# Patient Record
Sex: FEMALE | Race: WHITE | Employment: UNEMPLOYED | URBAN - METROPOLITAN AREA
[De-identification: names, ages, dates, MRNs, and addresses within clinical notes are randomized per-mention and may not be internally consistent; named-entity substitution may affect disease eponyms.]

---
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
## 2024-04-21 ENCOUNTER — HOSPITAL ENCOUNTER (EMERGENCY)
Age: 19
Discharge: HOME | End: 2024-04-21
Payer: COMMERCIAL

## 2024-04-21 VITALS
RESPIRATION RATE: 16 BRPM | OXYGEN SATURATION: 100 % | HEIGHT: 62 IN | DIASTOLIC BLOOD PRESSURE: 66 MMHG | SYSTOLIC BLOOD PRESSURE: 113 MMHG | WEIGHT: 160 LBS | HEART RATE: 71 BPM | TEMPERATURE: 98.1 F | BODY MASS INDEX: 29.44 KG/M2
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
## 2024-04-21 PROCEDURE — 90714 TD VACC NO PRESV 7 YRS+ IM: CPT | Performed by: PHYSICIAN ASSISTANT

## 2024-04-21 PROCEDURE — 90471 IMMUNIZATION ADMIN: CPT | Performed by: PHYSICIAN ASSISTANT

## 2024-04-21 PROCEDURE — 6370000000 HC RX 637 (ALT 250 FOR IP): Performed by: PHYSICIAN ASSISTANT

## 2024-04-21 PROCEDURE — 6360000002 HC RX W HCPCS: Performed by: PHYSICIAN ASSISTANT

## 2024-04-21 PROCEDURE — 99284 EMERGENCY DEPT VISIT MOD MDM: CPT

## 2024-04-21 RX ORDER — AMOXICILLIN AND CLAVULANATE POTASSIUM 875; 125 MG/1; MG/1
1 TABLET, FILM COATED ORAL ONCE
Status: COMPLETED | OUTPATIENT
Start: 2024-04-21 | End: 2024-04-21

## 2024-04-21 RX ORDER — AMOXICILLIN AND CLAVULANATE POTASSIUM 875; 125 MG/1; MG/1
1 TABLET, FILM COATED ORAL 2 TIMES DAILY
Qty: 14 TABLET | Refills: 0 | Status: SHIPPED | OUTPATIENT
Start: 2024-04-21 | End: 2024-04-28

## 2024-04-21 RX ADMIN — AMOXICILLIN AND CLAVULANATE POTASSIUM 1 TABLET: 875; 125 TABLET, FILM COATED ORAL at 00:58

## 2024-04-21 RX ADMIN — CLOSTRIDIUM TETANI TOXOID ANTIGEN (FORMALDEHYDE INACTIVATED) AND CORYNEBACTERIUM DIPHTHERIAE TOXOID ANTIGEN (FORMALDEHYDE INACTIVATED) 0.5 ML: 5; 2 INJECTION, SUSPENSION INTRAMUSCULAR at 00:59

## 2024-04-21 ASSESSMENT — LIFESTYLE VARIABLES
HOW OFTEN DO YOU HAVE A DRINK CONTAINING ALCOHOL: NEVER
HOW MANY STANDARD DRINKS CONTAINING ALCOHOL DO YOU HAVE ON A TYPICAL DAY: PATIENT DOES NOT DRINK

## 2024-04-21 ASSESSMENT — PAIN SCALES - GENERAL: PAINLEVEL_OUTOF10: 6

## 2024-04-21 ASSESSMENT — PAIN - FUNCTIONAL ASSESSMENT: PAIN_FUNCTIONAL_ASSESSMENT: 0-10

## 2024-04-21 NOTE — ED PROVIDER NOTES
DIAGNOSIS/MDM:   Vitals:    Vitals:    04/21/24 0030   BP: 113/66   Pulse: 71   Resp: 16   Temp: 98.1 °F (36.7 °C)   TempSrc: Oral   SpO2: 100%   Weight: 72.6 kg (160 lb)   Height: 1.575 m (5' 2\")       Patient was given the following medications:  Medications   tetanus & diphtheria toxoids (adult) 5-2 LFU injection 0.5 mL (0.5 mLs IntraMUSCular Given 4/21/24 0059)   amoxicillin-clavulanate (AUGMENTIN) 875-125 MG per tablet 1 tablet (1 tablet Oral Given 4/21/24 0058)             Is this patient to be included in the SEP-1 Core Measure due to severe sepsis or septic shock?   No   Exclusion criteria - the patient is NOT to be included for SEP-1 Core Measure due to:  Infection is not suspected    Chronic Conditions affecting care:  has a past medical history of Anxiety, Asthma, Depression, and RAD (reactive airway disease) (12/17/2012).    CONSULTS: (Who and What was discussed)  None      Social Determinants Significantly Affecting Health : None    Records Reviewed (External and Source) previous telemedicine office visit    CC/HPI Summary, DDx, ED Course, and Reassessment: Briefly, this is a 18-year-old female who presents to the emergency department today for evaluation for concerns of a cat bite which occurred earlier today.  She reports that her boyfriend's mother was taking care of a stray cat, and states that they have been taking care of the cat for several weeks.  The cat has not yet been to a .  She reports that the cat is receiving medications, and she states that she was attempting to give the Medications, when the cat bit her wrist.    Patient is unsure of her last tetanus, and states that they gave given the cat has not been to of that.  On examination she does have 4 puncture wounds noted with some mild edema but no erythema or warmth.      Disposition Considerations (tests considered but not done, Admit vs D/C, Shared Decision Making, Pt Expectation of Test or Tx.):    Patient's tetanus will

## 2024-09-10 ENCOUNTER — OFFICE VISIT (OUTPATIENT)
Dept: FAMILY MEDICINE CLINIC | Age: 19
End: 2024-09-10

## 2024-09-10 VITALS
HEIGHT: 62 IN | RESPIRATION RATE: 16 BRPM | HEART RATE: 82 BPM | BODY MASS INDEX: 29.33 KG/M2 | SYSTOLIC BLOOD PRESSURE: 100 MMHG | WEIGHT: 159.4 LBS | TEMPERATURE: 97 F | OXYGEN SATURATION: 96 % | DIASTOLIC BLOOD PRESSURE: 60 MMHG

## 2024-09-10 DIAGNOSIS — Z00.01 ENCOUNTER FOR WELL ADULT EXAM WITH ABNORMAL FINDINGS: Primary | ICD-10-CM

## 2024-09-10 DIAGNOSIS — Z11.9 SCREENING EXAMINATION FOR INFECTIOUS DISEASE: ICD-10-CM

## 2024-09-10 DIAGNOSIS — Z23 NEED FOR INFLUENZA VACCINATION: ICD-10-CM

## 2024-09-10 DIAGNOSIS — L70.0 ACNE VULGARIS: ICD-10-CM

## 2024-09-10 RX ORDER — LEVONORGESTREL 52 MG/1
1 INTRAUTERINE DEVICE INTRAUTERINE ONCE
COMMUNITY

## 2024-09-10 RX ORDER — BENZOYL PEROXIDE 10 G/100G
SUSPENSION TOPICAL
Qty: 227 G | Refills: 3 | Status: SHIPPED | OUTPATIENT
Start: 2024-09-10

## 2024-09-10 SDOH — ECONOMIC STABILITY: FOOD INSECURITY: WITHIN THE PAST 12 MONTHS, THE FOOD YOU BOUGHT JUST DIDN'T LAST AND YOU DIDN'T HAVE MONEY TO GET MORE.: NEVER TRUE

## 2024-09-10 SDOH — ECONOMIC STABILITY: FOOD INSECURITY: WITHIN THE PAST 12 MONTHS, YOU WORRIED THAT YOUR FOOD WOULD RUN OUT BEFORE YOU GOT MONEY TO BUY MORE.: NEVER TRUE

## 2024-09-10 SDOH — ECONOMIC STABILITY: INCOME INSECURITY: HOW HARD IS IT FOR YOU TO PAY FOR THE VERY BASICS LIKE FOOD, HOUSING, MEDICAL CARE, AND HEATING?: NOT HARD AT ALL

## 2024-09-10 ASSESSMENT — PATIENT HEALTH QUESTIONNAIRE - PHQ9
SUM OF ALL RESPONSES TO PHQ9 QUESTIONS 1 & 2: 0
2. FEELING DOWN, DEPRESSED OR HOPELESS: NOT AT ALL
SUM OF ALL RESPONSES TO PHQ QUESTIONS 1-9: 0
1. LITTLE INTEREST OR PLEASURE IN DOING THINGS: NOT AT ALL

## 2024-09-11 LAB
C TRACH DNA UR QL NAA+PROBE: NEGATIVE
N GONORRHOEA DNA UR QL NAA+PROBE: NEGATIVE

## 2024-10-01 ENCOUNTER — OFFICE VISIT (OUTPATIENT)
Dept: FAMILY MEDICINE CLINIC | Age: 19
End: 2024-10-01
Payer: COMMERCIAL

## 2024-10-01 VITALS
OXYGEN SATURATION: 98 % | DIASTOLIC BLOOD PRESSURE: 80 MMHG | RESPIRATION RATE: 16 BRPM | SYSTOLIC BLOOD PRESSURE: 108 MMHG | HEART RATE: 83 BPM | WEIGHT: 157.4 LBS | BODY MASS INDEX: 28.79 KG/M2 | TEMPERATURE: 97.1 F

## 2024-10-01 DIAGNOSIS — J45.990 EXERCISE INDUCED BRONCHOSPASM: ICD-10-CM

## 2024-10-01 DIAGNOSIS — R68.89 FLU-LIKE SYMPTOMS: Primary | ICD-10-CM

## 2024-10-01 DIAGNOSIS — J02.9 SORE THROAT: ICD-10-CM

## 2024-10-01 LAB
INFLUENZA A ANTIGEN, POC: NEGATIVE
INFLUENZA B ANTIGEN, POC: NEGATIVE
LOT EXPIRE DATE: NORMAL
LOT KIT NUMBER: NORMAL
S PYO AG THROAT QL: NORMAL
SARS-COV-2, POC: NORMAL
VALID INTERNAL CONTROL: NORMAL
VENDOR AND KIT NAME POC: NORMAL

## 2024-10-01 PROCEDURE — G8484 FLU IMMUNIZE NO ADMIN: HCPCS | Performed by: FAMILY MEDICINE

## 2024-10-01 PROCEDURE — 99213 OFFICE O/P EST LOW 20 MIN: CPT | Performed by: FAMILY MEDICINE

## 2024-10-01 PROCEDURE — 87880 STREP A ASSAY W/OPTIC: CPT | Performed by: FAMILY MEDICINE

## 2024-10-01 PROCEDURE — 1036F TOBACCO NON-USER: CPT | Performed by: FAMILY MEDICINE

## 2024-10-01 PROCEDURE — G8427 DOCREV CUR MEDS BY ELIG CLIN: HCPCS | Performed by: FAMILY MEDICINE

## 2024-10-01 PROCEDURE — 87428 SARSCOV & INF VIR A&B AG IA: CPT | Performed by: FAMILY MEDICINE

## 2024-10-01 PROCEDURE — G8419 CALC BMI OUT NRM PARAM NOF/U: HCPCS | Performed by: FAMILY MEDICINE

## 2024-10-01 RX ORDER — ALBUTEROL SULFATE 90 UG/1
2 INHALANT RESPIRATORY (INHALATION) 4 TIMES DAILY PRN
Qty: 18 G | Refills: 0 | Status: SHIPPED | OUTPATIENT
Start: 2024-10-01

## 2024-10-01 NOTE — PROGRESS NOTES
Chief complaint: Cough (HA, ST) and Fever (chills)      SUBJECTIVE:  HPI  Roxana Verdin (:  2005) is a 19 y.o. female with ANKITA and remote hx of asthma who presents with a chief complaint of: fever,cough, congestion, fevers/chills  Had fever 2 days ago. Feels slightly better today - hasn't needed dayquil or nyquil today, was living off it yesterday.  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 Did not have difficulty breathing.  Didn't mention it last time (at her annual a month ago) - is wheezing again when doing the treadmill. Ivonne like when she was a kid.   Hasn't needed albuterol since she was in 5th grade. Not waking up at night with/ cough or short of breath.  Bad cough for 2-3 weeks.  Has been sick 5 times in the last 5-6 mos - fevers,  calling off work; more since moving out. 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     Patient Active Problem List   Diagnosis    Allergic rhinitis    Adjustment disorder with mixed anxiety and depressed mood    History of speech therapy    Bilateral ankle pain    Muscle tightness    Sever's apophysitis, bilateral    Exercise induced bronchospasm     Past Medical History:   Diagnosis Date    Anxiety     Asthma     Depression     RAD (reactive airway disease) 2012     Current Outpatient Medications on File Prior to Visit   Medication Sig Dispense Refill    benzoyl peroxide 10 % external wash Use to wash face and body one to two times daily. 227 g 3    levonorgestrel (MIRENA, 52 MG,) IUD 52 mg 1 each by IntraUTERine route once Placed 2020; must remove in 2028      ibuprofen (IBU) 400 MG tablet Take 0.5 tablets by mouth every 8 hours as needed for Pain for 14 doses. Take with food 14 tablet 0     No current facility-administered medications on file prior to visit.       OBJECTIVE:  /80   Pulse 83   Temp 97.1 °F (36.2 °C) (Temporal)   Resp 16   Wt 71.4 kg (157 lb

## 2024-11-13 ENCOUNTER — TELEMEDICINE (OUTPATIENT)
Dept: FAMILY MEDICINE CLINIC | Age: 19
End: 2024-11-13
Payer: COMMERCIAL

## 2024-11-13 DIAGNOSIS — J45.990 EXERCISE INDUCED BRONCHOSPASM: Primary | ICD-10-CM

## 2024-11-13 PROCEDURE — G8419 CALC BMI OUT NRM PARAM NOF/U: HCPCS | Performed by: FAMILY MEDICINE

## 2024-11-13 PROCEDURE — 99214 OFFICE O/P EST MOD 30 MIN: CPT | Performed by: FAMILY MEDICINE

## 2024-11-13 PROCEDURE — G8427 DOCREV CUR MEDS BY ELIG CLIN: HCPCS | Performed by: FAMILY MEDICINE

## 2024-11-13 PROCEDURE — 1036F TOBACCO NON-USER: CPT | Performed by: FAMILY MEDICINE

## 2024-11-13 PROCEDURE — G8484 FLU IMMUNIZE NO ADMIN: HCPCS | Performed by: FAMILY MEDICINE

## 2024-11-13 RX ORDER — PREDNISONE 20 MG/1
40 TABLET ORAL DAILY
Qty: 10 TABLET | Refills: 0 | Status: SHIPPED | OUTPATIENT
Start: 2024-11-13 | End: 2024-11-18

## 2024-11-13 RX ORDER — FLUTICASONE PROPIONATE 44 UG/1
2 AEROSOL, METERED RESPIRATORY (INHALATION) 2 TIMES DAILY
Qty: 2 EACH | Refills: 1 | Status: SHIPPED | OUTPATIENT
Start: 2024-11-13

## 2024-11-13 NOTE — PROGRESS NOTES
Chief complaint: Asthma      SUBJECTIVE:  HPI  Roxana Verdin (:  2005) is a 19 y.o. female with a past medical history of exercise induced bronchospasm who presents with a chief complaint of: follow up asthma. Last time, I gave her an inhaler and it helped a lot. Has a cough from work that hasn't gone away in 3 weeks. Doesn't know what is causing the cough. 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 Inhaler helps a little but only works briefly. She has gone through 120 puffs in the last 4 weeks or so. Waking up multiple times at night with either coughing fits, or asthma attack or trouble breathing.  Last time she had asthma was at age 10, which was the last time she needed an inhaler  Sneezing and runny nose since getting her nose pierced but has a titanium nose piercing. Now it's infected so she's dealing with that.   Not taking anything for allergies right now.     Patient Active Problem List   Diagnosis    Allergic rhinitis    Adjustment disorder with mixed anxiety and depressed mood    History of speech therapy    Bilateral ankle pain    Muscle tightness    Sever's apophysitis, bilateral    Exercise induced bronchospasm     Past Medical History:   Diagnosis Date    Anxiety     Asthma     Depression     RAD (reactive airway disease) 2012     Current Outpatient Medications on File Prior to Visit   Medication Sig Dispense Refill    albuterol sulfate HFA (VENTOLIN HFA) 108 (90 Base) MCG/ACT inhaler Inhale 2 puffs into the lungs 4 times daily as needed for Wheezing 18 g 0    benzoyl peroxide 10 % external wash Use to wash face and body one to two times daily. 227 g 3    levonorgestrel (MIRENA, 52 MG,) IUD 52 mg 1 each by IntraUTERine route once Placed 2020; must remove in 2028       No current facility-administered medications on file prior to visit.       OBJECTIVE:  There were no vitals taken for this visit.     Physical Exam  Constitutional:

## 2024-11-20 ENCOUNTER — HOSPITAL ENCOUNTER (OUTPATIENT)
Dept: GENERAL RADIOLOGY | Age: 19
Discharge: HOME | End: 2024-11-20
Payer: COMMERCIAL

## 2024-11-20 DIAGNOSIS — J45.990 EXERCISE INDUCED BRONCHOSPASM: ICD-10-CM

## 2024-11-20 PROCEDURE — 71046 X-RAY EXAM CHEST 2 VIEWS: CPT

## 2025-03-17 ENCOUNTER — TELEPHONE (OUTPATIENT)
Dept: FAMILY MEDICINE CLINIC | Age: 20
End: 2025-03-17

## 2025-03-17 DIAGNOSIS — J45.990 EXERCISE INDUCED BRONCHOSPASM: ICD-10-CM

## 2025-03-17 RX ORDER — ALBUTEROL SULFATE 90 UG/1
2 INHALANT RESPIRATORY (INHALATION) 4 TIMES DAILY PRN
Qty: 18 G | Refills: 0 | Status: SHIPPED | OUTPATIENT
Start: 2025-03-17 | End: 2025-03-18 | Stop reason: SDUPTHER

## 2025-03-17 NOTE — TELEPHONE ENCOUNTER
Yes, I sent albuterol to [facility]. Please let her know and make sure she sees me tomorrow. Thanks!

## 2025-03-17 NOTE — TELEPHONE ENCOUNTER
Medication:   Requested Prescriptions     Pending Prescriptions Disp Refills    albuterol sulfate HFA (VENTOLIN HFA) 108 (90 Base) MCG/ACT inhaler 18 g 0     Sig: Inhale 2 puffs into the lungs 4 times daily as needed for Wheezing        Last Filled:  10/01/2024 ;18 g, Refills: 0 ordered     Patient Phone Number: [phone] (home)     Last appt: 11/13/2024   Next appt: 3/18/2025    Last OARRS:        No data to display

## 2025-03-17 NOTE — TELEPHONE ENCOUNTER
Patient requesting refill of...albuterol sulfate HFA (VENTOLIN HFA) 108 (90 Base) MCG/ACT inhaler [[id number]]    Order Details    Dose: 2 puff Route: Inhalation Frequency: 4 TIMES DAILY PRN for Wheezing   Dispense Quantity: 18 g Refills: 0          Sig: Inhale 2 puffs into the lungs 4 times daily as needed for Wheezing             Last visit date: 11/13/2024    Pharmacy...[facility] [id number] - [place] [address] [address] [phone] - F [phone]

## 2025-03-18 ENCOUNTER — OFFICE VISIT (OUTPATIENT)
Dept: FAMILY MEDICINE CLINIC | Age: 20
End: 2025-03-18
Payer: COMMERCIAL

## 2025-03-18 VITALS
HEIGHT: 62 IN | BODY MASS INDEX: 29.26 KG/M2 | SYSTOLIC BLOOD PRESSURE: 100 MMHG | TEMPERATURE: 97 F | WEIGHT: 159 LBS | DIASTOLIC BLOOD PRESSURE: 60 MMHG | HEART RATE: 84 BPM | OXYGEN SATURATION: 98 %

## 2025-03-18 DIAGNOSIS — L70.0 ACNE VULGARIS: ICD-10-CM

## 2025-03-18 DIAGNOSIS — J30.9 ALLERGIC RHINITIS, UNSPECIFIED SEASONALITY, UNSPECIFIED TRIGGER: ICD-10-CM

## 2025-03-18 DIAGNOSIS — J45.31 MILD PERSISTENT ASTHMA WITH ACUTE EXACERBATION: Primary | ICD-10-CM

## 2025-03-18 PROCEDURE — 99214 OFFICE O/P EST MOD 30 MIN: CPT | Performed by: FAMILY MEDICINE

## 2025-03-18 PROCEDURE — G8427 DOCREV CUR MEDS BY ELIG CLIN: HCPCS | Performed by: FAMILY MEDICINE

## 2025-03-18 PROCEDURE — G8419 CALC BMI OUT NRM PARAM NOF/U: HCPCS | Performed by: FAMILY MEDICINE

## 2025-03-18 PROCEDURE — 1036F TOBACCO NON-USER: CPT | Performed by: FAMILY MEDICINE

## 2025-03-18 RX ORDER — FLUTICASONE FUROATE 27.5 UG/1
2 SPRAY, METERED NASAL DAILY
Qty: 1 EACH | Refills: 5 | Status: SHIPPED | OUTPATIENT
Start: 2025-03-18

## 2025-03-18 RX ORDER — BENZOYL PEROXIDE 10 G/100G
SUSPENSION TOPICAL
Qty: 227 G | Refills: 3 | Status: SHIPPED | OUTPATIENT
Start: 2025-03-18

## 2025-03-18 RX ORDER — FLUTICASONE PROPIONATE 44 UG/1
2 AEROSOL, METERED RESPIRATORY (INHALATION) 2 TIMES DAILY
Qty: 2 EACH | Refills: 5 | Status: SHIPPED | OUTPATIENT
Start: 2025-03-18

## 2025-03-18 RX ORDER — ALBUTEROL SULFATE 90 UG/1
2 INHALANT RESPIRATORY (INHALATION) 4 TIMES DAILY PRN
Qty: 18 G | Refills: 5 | Status: SHIPPED | OUTPATIENT
Start: 2025-03-18

## 2025-03-18 SDOH — ECONOMIC STABILITY: FOOD INSECURITY: WITHIN THE PAST 12 MONTHS, YOU WORRIED THAT YOUR FOOD WOULD RUN OUT BEFORE YOU GOT MONEY TO BUY MORE.: NEVER TRUE

## 2025-03-18 SDOH — ECONOMIC STABILITY: FOOD INSECURITY: WITHIN THE PAST 12 MONTHS, THE FOOD YOU BOUGHT JUST DIDN'T LAST AND YOU DIDN'T HAVE MONEY TO GET MORE.: NEVER TRUE

## 2025-03-18 ASSESSMENT — PATIENT HEALTH QUESTIONNAIRE - PHQ9
1. LITTLE INTEREST OR PLEASURE IN DOING THINGS: NOT AT ALL
2. FEELING DOWN, DEPRESSED OR HOPELESS: NOT AT ALL
SUM OF ALL RESPONSES TO PHQ QUESTIONS 1-9: 0

## 2025-03-18 NOTE — PATIENT INSTRUCTIONS
Flonase sensimist  or nasacort or nasonex (nose sprays)- best medicine for allergies--> do this for 6 weeks    Pill medicines are ok too but are #2 to the nose spray (zyrtec - make you tierney sleepy, claritin, allegra)    Flovent will keep the asthma under control (treats asthma)    Albuterol is the rescue inhaler, as needed.

## 2025-03-18 NOTE — PROGRESS NOTES
Chief complaint: Shortness of Breath (Asthma has been bad last couple of weeks, waking up in the middle of the night not being able to breath, has inhaler but is currently out refill PLEASE REFILL facial wash and inhaler; PT would like to discuss a preventative inhaler as well)      SUBJECTIVE:  HPI  Roxana Verdin (:  2005) is a 19 y.o. female with a past medical history of exercise induced bronchospasm who presents with a chief complaint of: follow up asthma. A couple months ago had issues with asthma - I gave her both inhalers. Went away. But came back. Not as bad. Wakes up at night and can't catch her breath. Has coughing fits overnight. Woke up at 4m  morning. This plus allergies is not a good combination. Lots of coughing and shortness of breath. 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 Can't speak.  Inhaler 10x per day.  Previously diagnosed with exercise induced asthma. Has been waking up at night coughing.   Little white pill for allergies per her boyfriend's recommendation    Patient Active Problem List   Diagnosis    Allergic rhinitis    Adjustment disorder with mixed anxiety and depressed mood    History of speech therapy    Bilateral ankle pain    Muscle tightness    Sever's apophysitis, bilateral    Exercise induced bronchospasm     Past Medical History:   Diagnosis Date    Anxiety     Asthma     Depression     RAD (reactive airway disease) 2012     Current Outpatient Medications on File Prior to Visit   Medication Sig Dispense Refill    levonorgestrel (MIRENA, 52 MG,) IUD 52 mg 1 each by IntraUTERine route once Placed 2020; must remove in 2028       No current facility-administered medications on file prior to visit.       OBJECTIVE:  /60   Pulse 84   Temp 97 °F (36.1 °C) (Temporal)   Ht 1.575 m (5' 2\")   Wt 72.1 kg (159 lb)   LMP 2025   SpO2 98%   BMI 29.08 kg/m²      Physical exam:  afebrile, vitals reviewed  Gen:  NAD, pleasant  Eyes:

## 2025-05-07 ENCOUNTER — TELEPHONE (OUTPATIENT)
Dept: FAMILY MEDICINE CLINIC | Age: 20
End: 2025-05-07

## 2025-05-07 ENCOUNTER — TELEMEDICINE (OUTPATIENT)
Dept: FAMILY MEDICINE CLINIC | Age: 20
End: 2025-05-07
Payer: COMMERCIAL

## 2025-05-07 DIAGNOSIS — F43.23 ADJUSTMENT DISORDER WITH MIXED ANXIETY AND DEPRESSED MOOD: Primary | ICD-10-CM

## 2025-05-07 DIAGNOSIS — J45.30 MILD PERSISTENT ASTHMA WITHOUT COMPLICATION: ICD-10-CM

## 2025-05-07 DIAGNOSIS — L70.0 ACNE VULGARIS: ICD-10-CM

## 2025-05-07 PROCEDURE — 99214 OFFICE O/P EST MOD 30 MIN: CPT | Performed by: FAMILY MEDICINE

## 2025-05-07 PROCEDURE — G8427 DOCREV CUR MEDS BY ELIG CLIN: HCPCS | Performed by: FAMILY MEDICINE

## 2025-05-07 RX ORDER — FLUOXETINE 10 MG/1
10 CAPSULE ORAL DAILY
Qty: 30 CAPSULE | Refills: 3 | Status: SHIPPED | OUTPATIENT
Start: 2025-05-07

## 2025-05-07 RX ORDER — ONDANSETRON 4 MG/1
4 TABLET, ORALLY DISINTEGRATING ORAL 3 TIMES DAILY PRN
Qty: 30 TABLET | Refills: 0 | Status: SHIPPED | OUTPATIENT
Start: 2025-05-07

## 2025-05-07 RX ORDER — HYDROXYZINE HYDROCHLORIDE 25 MG/1
25 TABLET, FILM COATED ORAL EVERY 8 HOURS PRN
Qty: 30 TABLET | Refills: 0 | Status: SHIPPED | OUTPATIENT
Start: 2025-05-07 | End: 2025-05-17

## 2025-05-07 RX ORDER — BENZOYL PEROXIDE 10 G/100G
SUSPENSION TOPICAL
Qty: 227 G | Refills: 3 | Status: SHIPPED | OUTPATIENT
Start: 2025-05-07

## 2025-05-07 RX ORDER — ALBUTEROL SULFATE 90 UG/1
2 INHALANT RESPIRATORY (INHALATION) 4 TIMES DAILY PRN
Qty: 18 G | Refills: 5 | Status: SHIPPED | OUTPATIENT
Start: 2025-05-07

## 2025-05-07 NOTE — PROGRESS NOTES
2025    TELEHEALTH EVALUATION -- Audio/Visual (During COVID-19 public health emergency)    HPI:    Roxana Verdin (:  2005) has requested an audio/video evaluation for the following concern(s):    Hx depression, has been controlled without medications until recently. Went through bad breakup recently and is having a hard time. Has not been able to eat in 5 days, is vomiting up everything she eats. This has happened before after a bad break up. Takes a whlie to get over it.   Was on prozac before which helped.   Requesting counseling and PRN medications for sleep as well.     Review of Systems:  Gen:  Denies fever, chills, headaches. No weight loss  HEENT:  Denies cold symptoms, sore throat.  CV:  Denies chest pain or tightness, palpitations.  Pulm:  Denies shortness of breath, cough.  Abd:  Denies abdominal pain, change in bowel habits.    Prior to Visit Medications    Medication Sig Taking? Authorizing Provider   albuterol sulfate HFA (VENTOLIN HFA) 108 (90 Base) MCG/ACT inhaler Inhale 2 puffs into the lungs 4 times daily as needed for Wheezing Yes Anusha Durbin MD   benzoyl peroxide 10 % external wash Use to wash face and body one to two times daily. Yes Anusha Durbin MD   fluticasone (FLOVENT HFA) 44 MCG/ACT inhaler Inhale 2 puffs into the lungs 2 times daily Yes Anusha Durbin MD   fluticasone (FLONASE SENSIMIST) 27.5 MCG/SPRAY nasal spray 2 sprays by Each Nostril route daily Yes Anusha Durbin MD   levonorgestrel (MIRENA, 52 MG,) IUD 52 mg 1 each by IntraUTERine route once Placed 2020; must remove in 2028 Yes Provider, MD Yuly       Past Medical History:   Diagnosis Date    Anxiety     Asthma     Depression     RAD (reactive airway disease) 2012       No past surgical history on file.    Family History   Problem Relation Age of Onset    Stroke Mother     High Cholesterol Mother     Dementia Maternal Grandmother     Heart Disease Maternal Grandfather

## 2025-05-07 NOTE — TELEPHONE ENCOUNTER
Pt called in saying the referral she had gotten today for Psychiatry does not have an opening until June 30 and she can not wait that long. Was wondering if there are any other referrals she could call to see if she can get in sooner.     Please advise

## 2025-06-11 ENCOUNTER — TELEPHONE (OUTPATIENT)
Dept: FAMILY MEDICINE CLINIC | Age: 20
End: 2025-06-11

## 2025-06-11 NOTE — TELEPHONE ENCOUNTER
Pt said she has been having allergic reaction runny nose and sneezing for the past 3 weeks and now since last Sunday she has had hives all over her body. She said she doesn't have time for an appt and will be leaving town soon would like to know what she can do. What the DrEnzo would suggest.     Please advise

## 2025-06-11 NOTE — TELEPHONE ENCOUNTER
Patient scheduled.    Recent Visits  Date Type Provider Dept   03/18/25 Office Visit Anusha Durbin MD [facility]   10/01/24 Office Visit Anusha Durbin MD [facility]   09/10/24 Office Visit Anusha Durbin MD [facility]   Showing recent visits within past 540 days with a meds authorizing provider and meeting all other requirements  Future Appointments  Date Type Provider Dept   06/17/25 Appointment Anusha Durbin MD [facility]   Showing future appointments within next 150 days with a meds authorizing provider and meeting all other requirements

## 2025-06-27 ENCOUNTER — OFFICE VISIT (OUTPATIENT)
Dept: FAMILY MEDICINE CLINIC | Age: 20
End: 2025-06-27
Payer: COMMERCIAL

## 2025-06-27 VITALS
OXYGEN SATURATION: 97 % | TEMPERATURE: 97.3 F | HEART RATE: 91 BPM | BODY MASS INDEX: 27.9 KG/M2 | WEIGHT: 151.6 LBS | SYSTOLIC BLOOD PRESSURE: 100 MMHG | HEIGHT: 62 IN | DIASTOLIC BLOOD PRESSURE: 70 MMHG

## 2025-06-27 DIAGNOSIS — L50.9 URTICARIA: Primary | ICD-10-CM

## 2025-06-27 DIAGNOSIS — J30.9 ALLERGIC RHINITIS, UNSPECIFIED SEASONALITY, UNSPECIFIED TRIGGER: ICD-10-CM

## 2025-06-27 DIAGNOSIS — J45.30 MILD PERSISTENT ASTHMA WITHOUT COMPLICATION: ICD-10-CM

## 2025-06-27 PROCEDURE — G8419 CALC BMI OUT NRM PARAM NOF/U: HCPCS | Performed by: FAMILY MEDICINE

## 2025-06-27 PROCEDURE — G8427 DOCREV CUR MEDS BY ELIG CLIN: HCPCS | Performed by: FAMILY MEDICINE

## 2025-06-27 PROCEDURE — 99213 OFFICE O/P EST LOW 20 MIN: CPT | Performed by: FAMILY MEDICINE

## 2025-06-27 PROCEDURE — 1036F TOBACCO NON-USER: CPT | Performed by: FAMILY MEDICINE

## 2025-06-27 RX ORDER — FEXOFENADINE HCL 180 MG/1
180 TABLET ORAL DAILY
COMMUNITY

## 2025-06-27 RX ORDER — DIPHENHYDRAMINE HCL 25 MG
TABLET ORAL EVERY 6 HOURS PRN
COMMUNITY

## 2025-06-27 NOTE — PROGRESS NOTES
Chief complaint: Other (Fever 103.1 last Wed; not sure if it's related Hives;pt claims  that stared on hands, palms,forearms 3wks ago; torso,face 2wks, neck, thighs 1&1/2 wks found out the had a washer issue ; mold in the water (DISCUSS GETTING ALLERGY TEST))      SUBJECTIVE:  MARILYN Verdin (:  2005) is a 20 y.o. female with a past medical history of ANKITA and asthma who presents with a chief complaint of: follow up skin concerns. Comes and goes. Right now it's not bad. Trying not to scratch it so it doesn't get inflammed. Started on palms and scratching palms, then traveled to hands and arms and inner arms, abdomen.  Had an old washer with mold. Smelly. Got a new washer.  Thought it was perfume or cat. Allergies may be getting bad again. Not using  Was using allegra and benadryl when had the rash. But hasn't been on these for a few days. If she doesn't itch it, it doesn't get bad.  Has pictures (dermatographism present)- urticaria on top of diffuse erythema along entire flanks, inner arms along AC fossa, upper arms, entire upper thighs.  Would get hives around underwear line when she put underwear on.  Itchy scalp sometimes.  Right face, neck itchy  Wondering about allergy testing.   Had reaction from ex-boyfriends dog 1.5mos ago - sneezing a lot and runny nose    Patient Active Problem List   Diagnosis    Allergic rhinitis    Adjustment disorder with mixed anxiety and depressed mood    History of speech therapy    Bilateral ankle pain    Muscle tightness    Sever's apophysitis, bilateral    Exercise induced bronchospasm    Urticaria    Mild persistent asthma without complication     Past Medical History:   Diagnosis Date    Anxiety     Asthma     Depression     RAD (reactive airway disease) 2012     Current Outpatient Medications on File Prior to Visit   Medication Sig Dispense Refill    diphenhydrAMINE (BENADRYL) 25 MG tablet Take by mouth every 6 hours as needed for Itching

## 2025-07-09 NOTE — PROGRESS NOTES
CC:  Beata Severino is here today for Office Visit (Drainage, coughing)    Medications: medications verified, no change  Added preferred pharmacy  Denies  known Latex allergy or symptoms of Latex sensitivity.  All allergies and medications reviewed.  Patient would like communication of their results via:      Cell Phone:   Telephone Information:   Mobile [phone]     Okay to leave a message containing results? Yes      Rooming documentation of social history includes tobacco screening only.     Chief complaint: Pharyngitis ((for 4 days) concerned it's strep or COVID; was exposed to [employer] at work)      SUBJECTIVE:  MARILYN Amor (:  2005) is a 15 y.o. female with a past medical history of adjustment d/o w/ mixed anxiety and depression who presents with a chief complaint of: sore throat, mild SOB on .  headache, nausea started last night. She reports having nausea when she had covid in October. She had sore throat and SOB 3 days ago. She isn't feeling short of breath right now. covid is going around her work. Pt wears a mask at school, work and in public. Last work day was on Thursday. She has a feeling she has covid. She had this feeling last time, when she did have covid    One friend had strep throat 2 weeks ago. No fevers. Review of Systems:  General: No F/C/NS/fatigue/wt loss   Cardiovascular: No CP  Respiratory: No SOB, cough  GI: No N/V/D/C/abd pain/blood in stool  Neuro: No HA/weakness  Psych: No depressed mood/anxiety  Musculoskeletal: No myalgias    Past Medical History:   Diagnosis Date    Anxiety     Asthma     Depression     RAD (reactive airway disease) 2012     Current Outpatient Medications on File Prior to Visit   Medication Sig Dispense Refill    norethindrone (MICRONOR) 0.35 MG tablet Take 0.35 mg by mouth every 24 hours      Benzoyl Peroxide (BENZOYL PEROXIDE) 10 % external wash Use to wash face and body one to two times daily. 227 g 3    clotrimazole (LOTRIMIN AF) 1 % cream Apply topically 2 times daily. 28 g 1    ibuprofen (IBU) 400 MG tablet Take 0.5 tablets by mouth every 8 hours as needed for Pain for 14 doses. Take with food 14 tablet 0    FLUoxetine (PROZAC) 10 MG capsule Take 1 capsule by mouth daily (Patient not taking: Reported on 2022) 60 capsule 0    brompheniramine-pseudoephedrine-DM 2-30-10 MG/5ML syrup  (Patient not taking: Reported on 2021)       No current facility-administered medications on file prior to visit. OBJECTIVE:  There were no vitals taken for this visit. Physical Exam  Constitutional:       General: Not in acute distress. Appearance: Normal appearance. Not ill-appearing. HENT:      Head: Normocephalic and atraumatic. Nose: Nose normal.   Pulmonary:      Effort: Pulmonary effort is normal. No respiratory distress. No cough  Neurological:      General: No focal deficit present. Mental Status: Alert. Psychiatric:         Mood and Affect: Mood normal.         Behavior: Behavior normal.    ASSESSMENT/PLAN:  1. Exposure to COVID-19 virus  -     COVID-19; Future  New, uncontrolled. No respiratory distress or evidence of moderate to severe range dehydration. Given known exposure, she most likely has Covid-19 infection. Discussed presuming she's positive and isolating. She prefers knowing if she's negative because they are short staffed at work and she'd go back sooner if possible. covid-19 test sent to [facility]    Pt and [address] agrees with plan    Return in about 2 days (around 1/8/2022), or if symptoms worsen or fail to improve. Electronically signed by Derrek Pandey MD on 1/6/2022 at 12:01 PM.     Please note, portions of this note were completed with a voice recognition program.  Although every effort was made to ensure the accuracy of this automated transcription, some errors in transcription may have occurred.